# Patient Record
Sex: MALE | Race: WHITE | NOT HISPANIC OR LATINO | Employment: UNEMPLOYED | URBAN - METROPOLITAN AREA
[De-identification: names, ages, dates, MRNs, and addresses within clinical notes are randomized per-mention and may not be internally consistent; named-entity substitution may affect disease eponyms.]

---

## 2018-06-01 ENCOUNTER — OFFICE VISIT CONVERTED (OUTPATIENT)
Dept: FAMILY MEDICINE CLINIC | Facility: CLINIC | Age: 33
End: 2018-06-01
Attending: NURSE PRACTITIONER

## 2019-09-09 ENCOUNTER — OFFICE VISIT CONVERTED (OUTPATIENT)
Dept: FAMILY MEDICINE CLINIC | Facility: CLINIC | Age: 34
End: 2019-09-09
Attending: FAMILY MEDICINE

## 2020-01-31 ENCOUNTER — HOSPITAL ENCOUNTER (OUTPATIENT)
Dept: FAMILY MEDICINE CLINIC | Facility: CLINIC | Age: 35
Discharge: HOME | End: 2020-01-31
Attending: NURSE PRACTITIONER

## 2020-01-31 ENCOUNTER — OFFICE VISIT CONVERTED (OUTPATIENT)
Dept: FAMILY MEDICINE CLINIC | Facility: CLINIC | Age: 35
End: 2020-01-31
Attending: NURSE PRACTITIONER

## 2020-03-02 ENCOUNTER — HOSPITAL ENCOUNTER (OUTPATIENT)
Dept: FAMILY MEDICINE CLINIC | Facility: CLINIC | Age: 35
Discharge: HOME | End: 2020-03-02
Attending: NURSE PRACTITIONER

## 2020-03-02 ENCOUNTER — OFFICE VISIT CONVERTED (OUTPATIENT)
Dept: FAMILY MEDICINE CLINIC | Facility: CLINIC | Age: 35
End: 2020-03-02
Attending: NURSE PRACTITIONER

## 2020-07-10 ENCOUNTER — OFFICE VISIT CONVERTED (OUTPATIENT)
Dept: FAMILY MEDICINE CLINIC | Facility: CLINIC | Age: 35
End: 2020-07-10
Attending: NURSE PRACTITIONER

## 2021-05-09 VITALS
HEIGHT: 72 IN | SYSTOLIC BLOOD PRESSURE: 122 MMHG | TEMPERATURE: 97.8 F | DIASTOLIC BLOOD PRESSURE: 78 MMHG | OXYGEN SATURATION: 97 % | BODY MASS INDEX: 25.65 KG/M2 | HEART RATE: 73 BPM | WEIGHT: 189.37 LBS

## 2021-05-09 VITALS
WEIGHT: 188 LBS | DIASTOLIC BLOOD PRESSURE: 80 MMHG | SYSTOLIC BLOOD PRESSURE: 130 MMHG | TEMPERATURE: 98.6 F | OXYGEN SATURATION: 97 % | HEART RATE: 94 BPM | RESPIRATION RATE: 16 BRPM

## 2021-05-09 VITALS
WEIGHT: 196 LBS | SYSTOLIC BLOOD PRESSURE: 140 MMHG | DIASTOLIC BLOOD PRESSURE: 85 MMHG | HEART RATE: 84 BPM | OXYGEN SATURATION: 97 % | TEMPERATURE: 97.6 F

## 2021-05-09 VITALS
OXYGEN SATURATION: 98 % | DIASTOLIC BLOOD PRESSURE: 90 MMHG | TEMPERATURE: 98 F | HEART RATE: 88 BPM | WEIGHT: 195.37 LBS | SYSTOLIC BLOOD PRESSURE: 140 MMHG

## 2021-05-09 VITALS
TEMPERATURE: 97.3 F | HEIGHT: 72 IN | OXYGEN SATURATION: 96 % | SYSTOLIC BLOOD PRESSURE: 120 MMHG | DIASTOLIC BLOOD PRESSURE: 80 MMHG | WEIGHT: 195.12 LBS | BODY MASS INDEX: 26.43 KG/M2 | HEART RATE: 88 BPM

## 2021-12-07 ENCOUNTER — OFFICE VISIT (OUTPATIENT)
Dept: FAMILY MEDICINE CLINIC | Facility: CLINIC | Age: 36
End: 2021-12-07

## 2021-12-07 VITALS
HEART RATE: 98 BPM | WEIGHT: 203 LBS | BODY MASS INDEX: 28.42 KG/M2 | SYSTOLIC BLOOD PRESSURE: 150 MMHG | HEIGHT: 71 IN | OXYGEN SATURATION: 98 % | DIASTOLIC BLOOD PRESSURE: 90 MMHG | TEMPERATURE: 97.2 F

## 2021-12-07 DIAGNOSIS — R07.9 CHEST PAIN, UNSPECIFIED TYPE: Primary | ICD-10-CM

## 2021-12-07 DIAGNOSIS — Z11.59 NEED FOR HEPATITIS C SCREENING TEST: ICD-10-CM

## 2021-12-07 DIAGNOSIS — R03.0 ELEVATED BLOOD PRESSURE READING IN OFFICE WITHOUT DIAGNOSIS OF HYPERTENSION: ICD-10-CM

## 2021-12-07 DIAGNOSIS — G89.29 CHRONIC PAIN OF LEFT ANKLE: ICD-10-CM

## 2021-12-07 DIAGNOSIS — M25.572 CHRONIC PAIN OF LEFT ANKLE: ICD-10-CM

## 2021-12-07 LAB
ALBUMIN SERPL-MCNC: 4.7 G/DL (ref 3.5–5.2)
ALBUMIN/GLOB SERPL: 1.4 G/DL
ALP SERPL-CCNC: 61 U/L (ref 39–117)
ALT SERPL W P-5'-P-CCNC: 34 U/L (ref 1–41)
ANION GAP SERPL CALCULATED.3IONS-SCNC: 9.9 MMOL/L (ref 5–15)
AST SERPL-CCNC: 26 U/L (ref 1–40)
BASOPHILS # BLD AUTO: 0.03 10*3/MM3 (ref 0–0.2)
BASOPHILS NFR BLD AUTO: 0.5 % (ref 0–1.5)
BILIRUB SERPL-MCNC: 0.8 MG/DL (ref 0–1.2)
BUN SERPL-MCNC: 16 MG/DL (ref 6–20)
BUN/CREAT SERPL: 15.7 (ref 7–25)
CALCIUM SPEC-SCNC: 9.9 MG/DL (ref 8.6–10.5)
CHLORIDE SERPL-SCNC: 100 MMOL/L (ref 98–107)
CHOLEST SERPL-MCNC: 226 MG/DL (ref 0–200)
CK SERPL-CCNC: 301 U/L (ref 20–200)
CO2 SERPL-SCNC: 28.1 MMOL/L (ref 22–29)
CREAT SERPL-MCNC: 1.02 MG/DL (ref 0.76–1.27)
DEPRECATED RDW RBC AUTO: 38.6 FL (ref 37–54)
EOSINOPHIL # BLD AUTO: 0.03 10*3/MM3 (ref 0–0.4)
EOSINOPHIL NFR BLD AUTO: 0.5 % (ref 0.3–6.2)
ERYTHROCYTE [DISTWIDTH] IN BLOOD BY AUTOMATED COUNT: 12.5 % (ref 12.3–15.4)
GFR SERPL CREATININE-BSD FRML MDRD: 83 ML/MIN/1.73
GLOBULIN UR ELPH-MCNC: 3.3 GM/DL
GLUCOSE SERPL-MCNC: 99 MG/DL (ref 65–99)
HCT VFR BLD AUTO: 43.8 % (ref 37.5–51)
HCV AB SER DONR QL: NORMAL
HDLC SERPL-MCNC: 49 MG/DL (ref 40–60)
HGB BLD-MCNC: 15.6 G/DL (ref 13–17.7)
IMM GRANULOCYTES # BLD AUTO: 0.01 10*3/MM3 (ref 0–0.05)
IMM GRANULOCYTES NFR BLD AUTO: 0.2 % (ref 0–0.5)
LDLC SERPL CALC-MCNC: 145 MG/DL (ref 0–100)
LDLC/HDLC SERPL: 2.89 {RATIO}
LYMPHOCYTES # BLD AUTO: 1.51 10*3/MM3 (ref 0.7–3.1)
LYMPHOCYTES NFR BLD AUTO: 26.4 % (ref 19.6–45.3)
MAGNESIUM SERPL-MCNC: 2 MG/DL (ref 1.6–2.6)
MCH RBC QN AUTO: 30.5 PG (ref 26.6–33)
MCHC RBC AUTO-ENTMCNC: 35.6 G/DL (ref 31.5–35.7)
MCV RBC AUTO: 85.7 FL (ref 79–97)
MONOCYTES # BLD AUTO: 0.39 10*3/MM3 (ref 0.1–0.9)
MONOCYTES NFR BLD AUTO: 6.8 % (ref 5–12)
NEUTROPHILS NFR BLD AUTO: 3.75 10*3/MM3 (ref 1.7–7)
NEUTROPHILS NFR BLD AUTO: 65.6 % (ref 42.7–76)
NRBC BLD AUTO-RTO: 0 /100 WBC (ref 0–0.2)
PLATELET # BLD AUTO: 242 10*3/MM3 (ref 140–450)
PMV BLD AUTO: 10.6 FL (ref 6–12)
POTASSIUM SERPL-SCNC: 4.2 MMOL/L (ref 3.5–5.2)
PROT SERPL-MCNC: 8 G/DL (ref 6–8.5)
RBC # BLD AUTO: 5.11 10*6/MM3 (ref 4.14–5.8)
SODIUM SERPL-SCNC: 138 MMOL/L (ref 136–145)
T4 FREE SERPL-MCNC: 1.13 NG/DL (ref 0.93–1.7)
TRIGL SERPL-MCNC: 177 MG/DL (ref 0–150)
TROPONIN T SERPL-MCNC: <0.01 NG/ML (ref 0–0.03)
TSH SERPL DL<=0.05 MIU/L-ACNC: 1.55 UIU/ML (ref 0.27–4.2)
VLDLC SERPL-MCNC: 32 MG/DL (ref 5–40)
WBC NRBC COR # BLD: 5.72 10*3/MM3 (ref 3.4–10.8)

## 2021-12-07 PROCEDURE — 82043 UR ALBUMIN QUANTITATIVE: CPT | Performed by: NURSE PRACTITIONER

## 2021-12-07 PROCEDURE — 84439 ASSAY OF FREE THYROXINE: CPT | Performed by: NURSE PRACTITIONER

## 2021-12-07 PROCEDURE — 85025 COMPLETE CBC W/AUTO DIFF WBC: CPT | Performed by: NURSE PRACTITIONER

## 2021-12-07 PROCEDURE — 82550 ASSAY OF CK (CPK): CPT | Performed by: NURSE PRACTITIONER

## 2021-12-07 PROCEDURE — 83735 ASSAY OF MAGNESIUM: CPT | Performed by: NURSE PRACTITIONER

## 2021-12-07 PROCEDURE — 86803 HEPATITIS C AB TEST: CPT | Performed by: NURSE PRACTITIONER

## 2021-12-07 PROCEDURE — 93000 ELECTROCARDIOGRAM COMPLETE: CPT | Performed by: NURSE PRACTITIONER

## 2021-12-07 PROCEDURE — 84484 ASSAY OF TROPONIN QUANT: CPT | Performed by: NURSE PRACTITIONER

## 2021-12-07 PROCEDURE — 80053 COMPREHEN METABOLIC PANEL: CPT | Performed by: NURSE PRACTITIONER

## 2021-12-07 PROCEDURE — 99214 OFFICE O/P EST MOD 30 MIN: CPT | Performed by: NURSE PRACTITIONER

## 2021-12-07 PROCEDURE — 84443 ASSAY THYROID STIM HORMONE: CPT | Performed by: NURSE PRACTITIONER

## 2021-12-07 PROCEDURE — 80061 LIPID PANEL: CPT | Performed by: NURSE PRACTITIONER

## 2021-12-07 PROCEDURE — 82570 ASSAY OF URINE CREATININE: CPT | Performed by: NURSE PRACTITIONER

## 2021-12-07 PROCEDURE — 82553 CREATINE MB FRACTION: CPT | Performed by: NURSE PRACTITIONER

## 2021-12-07 NOTE — PROGRESS NOTES
Chief Complaint  Ankle Pain (LT lower leg pain ) and Chest Pain (irregular heart beat, asked for a referral to cardiology )    Subjective     {Problem List  Visit Diagnosis   Encounters  Notes  Medications  Labs  Result Review Imaging  Media :23}       Mick Howe presents to [facility]  History of Present Illness     Patient reports a history of chest pain. He knows that his BP is slightly elevated. He is known to Dr. Diallo, but he retired. He states that he has a low heart rate in the AM, around 48. Once he is up and about it will increase into the 50s. He continues to have intermittent chest pain and bradycardia. States it seems to be more prominent at night and when at rest.     He states he was seen here for the chest pain two years ago - had blood work, EKG, and then also had an echo. He did not have a Holter or stress test. 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 Despite the elevated BP they did not put him on any medication. He has been trying to follow a DASH diet and eat healthier. He does not eat red meat. He does exercise. States he was exercising 3-4 times per week, but recently developed leg pain and cut back.     He has left lower leg pain for the past 3-4 months - will become really tender in his ankle and tight; like it is inflamed. It will go away and then come back and he feels like it isn't healing. He denies any known injury; it just appeared one day and then increased in severity. At first he thought shin splints, but he didn't get better with rest. When he was exercising he was lifting weights and then a light to moderate jog a few times per week but he wasn't running extensively on it.     He had his annual physical exam with Dr. Sidhu ([facility]). He had a CMP and lipid done there.     History reviewed. No pertinent past medical history.    Allergies   Allergen Reactions   •  "Latex Rash        History reviewed. No pertinent surgical history.     Social History     Tobacco Use   • Smoking status: Never Smoker   • Smokeless tobacco: Never Used   Substance Use Topics   • Alcohol use: Not on file   • Drug use: Not on file       Family History   Problem Relation Age of Onset   • Hypothyroidism Mother    • Hypertension Father    • Heart failure Father    • Stroke Maternal Grandfather         Health Maintenance Due   Topic Date Due   • HEPATITIS C SCREENING  Never done        No current outpatient medications on file prior to visit.     No current facility-administered medications on file prior to visit.       Immunization History   Administered Date(s) Administered   • COVID-19 (PFIZER) 01/08/2021, 01/29/2021   • Flu Vaccine Quad PF >36MO 10/07/2015, 09/17/2018   • Hepatitis A 07/15/2018, 02/14/2019   • MMR 11/21/2018   • Td 08/19/2002   • Tdap 01/01/2014       Review of Systems     Objective     /90   Pulse 98   Temp 97.2 °F (36.2 °C)   Ht 180.3 cm (71\")   Wt 92.1 kg (203 lb)   SpO2 98%   BMI 28.31 kg/m²       Physical Exam  Vitals reviewed.   Constitutional:       General: He is not in acute distress.     Appearance: Normal appearance. He is well-developed and overweight.   HENT:      Head: Normocephalic and atraumatic.   Eyes:      General: No scleral icterus.     Conjunctiva/sclera: Conjunctivae normal.      Pupils: Pupils are equal, round, and reactive to light.   Neck:      Thyroid: No thyroid mass, thyromegaly or thyroid tenderness.      Vascular: No carotid bruit.      Trachea: Trachea normal.   Cardiovascular:      Rate and Rhythm: Normal rate and regular rhythm.      Pulses: Normal pulses.      Heart sounds: No murmur heard.      Pulmonary:      Effort: Pulmonary effort is normal.      Breath sounds: Normal breath sounds. No wheezing, rhonchi or rales.   Musculoskeletal:         General: Normal range of motion.      Cervical back: Normal range of motion and neck supple. "      Right lower leg: No edema.      Left lower leg: No edema.      Comments: No gross deformity of the left lower extremity/ankle. Range of motion appears to be within normal limits. No audible crepitus. No abnormal gait. No obvious swelling or erythema is appreciated.   Lymphadenopathy:      Cervical: No cervical adenopathy.   Skin:     General: Skin is warm and dry.   Neurological:      Mental Status: He is alert and oriented to person, place, and time.   Psychiatric:         Mood and Affect: Mood and affect normal.         Behavior: Behavior normal.         Thought Content: Thought content normal.         Judgment: Judgment normal.         Result Review :     The following data was reviewed by: HARMAN Shahid on 12/07/2021:    COMPREHENSIVE METABOLIC PANEL (01/08/2021 11:06)  LIPID PANEL (01/08/2021 11:06)    TROPONIN (IN-HOUSE) (09/17/2020 02:14)  MAGNESIUM (09/17/2020 02:14)  PROBNP (REFERENCE) (09/17/2020 02:14)  COMPREHENSIVE METABOLIC PANEL (09/17/2020 02:14)  CBC AND DIFFERENTIAL (09/17/2020 02:14)  APTT (09/17/2020 02:14)  PROTIME-INR (09/17/2020 02:14)  TROPONIN (IN-HOUSE) (09/17/2020 05:08)    Data reviewed: Consultant notes : Notes from Dr. Alejandro Mason in Care Everywhere, as well as physical note from Dr. Sidhu.      XR Ankle 3+ View Left (In Office) (12/07/2021 11:59)           Assessment and Plan      Diagnoses and all orders for this visit:    1. Chest pain, unspecified type (Primary)  -     Ambulatory Referral to Cardiology  -     CBC Auto Differential  -     Comprehensive Metabolic Panel  -     Lipid Panel  -     TSH+Free T4  -     Troponin  -     CK  -     CK MB  -     Magnesium  -     XR Ankle 3+ View Left (In Office)  -     ECG 12 Lead    2. Elevated blood pressure reading in office without diagnosis of hypertension  -     Ambulatory Referral to Cardiology  -     CBC Auto Differential  -     Comprehensive Metabolic Panel  -     Lipid Panel  -     TSH+Free T4  -     Microalbumin /  Creatinine Urine Ratio - Urine, Clean Catch  -     Troponin  -     CK  -     CK MB  -     Magnesium  -     XR Ankle 3+ View Left (In Office)    3. Chronic pain of left ankle  -     XR Ankle 3+ View Left (In Office)  -     MRI Ankle Left Without Contrast; Future    4. Need for hepatitis C screening test  -     Hepatitis C Antibody            Follow Up     Return for recheck pending outcome of labs.     He is going to see if he can find records regarding which medication he has taken in the past for his BP and did not tolerate. He will call and let me know. Advised that if he does not get in with cardiology in the next 2-3 weeks, then I would like to see him back for repeat BP. Voiced understanding.     Will order MRI of the ankle. Referral to ortho vs. Podiatry pending outcome of MRI. His wife is PT at [facility] and he wants to speak with her regarding who he should see.     Patient was given instructions and counseling regarding his condition or for health maintenance advice. Please see specific information pulled into the AVS if appropriate.     Mick Howe  reports that he has never smoked. He has never used smokeless tobacco.

## 2021-12-07 NOTE — PROGRESS NOTES
Venipuncture Blood Specimen Collection  Venipuncture performed in left arm by Trinity Ramirez with good hemostasis. Patient tolerated the procedure well without complications.   12/07/21   Trinity Ramirez

## 2021-12-08 DIAGNOSIS — R74.8 ELEVATED CK: Primary | ICD-10-CM

## 2021-12-08 LAB
ALBUMIN UR-MCNC: <1.2 MG/DL
CK MB SERPL-CCNC: 1.88 NG/ML
CREAT UR-MCNC: 60.4 MG/DL
MICROALBUMIN/CREAT UR: NORMAL MG/G{CREAT}

## 2021-12-13 ENCOUNTER — CLINICAL SUPPORT (OUTPATIENT)
Dept: FAMILY MEDICINE CLINIC | Facility: CLINIC | Age: 36
End: 2021-12-13

## 2021-12-13 DIAGNOSIS — R74.8 ELEVATED CK: ICD-10-CM

## 2021-12-13 LAB — CK SERPL-CCNC: 163 U/L (ref 20–200)

## 2021-12-13 PROCEDURE — 82550 ASSAY OF CK (CPK): CPT | Performed by: NURSE PRACTITIONER

## 2021-12-13 PROCEDURE — 36415 COLL VENOUS BLD VENIPUNCTURE: CPT | Performed by: NURSE PRACTITIONER

## 2021-12-13 NOTE — PROGRESS NOTES
Venipuncture Blood Specimen Collection  Venipuncture performed in left arm by Trinity Ramirez with good hemostasis. Patient tolerated the procedure well without complications.   12/13/21   Trinity Ramirez

## 2021-12-17 DIAGNOSIS — M67.472 GANGLION CYST OF LEFT FOOT: Primary | ICD-10-CM

## 2021-12-17 DIAGNOSIS — G89.29 CHRONIC PAIN OF LEFT ANKLE: ICD-10-CM

## 2021-12-17 DIAGNOSIS — M25.572 CHRONIC PAIN OF LEFT ANKLE: ICD-10-CM

## 2021-12-17 DIAGNOSIS — M67.88 LEFT PERONEAL TENDONOSIS: ICD-10-CM

## 2022-01-06 ENCOUNTER — OFFICE VISIT (OUTPATIENT)
Dept: FAMILY MEDICINE CLINIC | Facility: CLINIC | Age: 37
End: 2022-01-06

## 2022-01-06 ENCOUNTER — PATIENT MESSAGE (OUTPATIENT)
Dept: FAMILY MEDICINE CLINIC | Facility: CLINIC | Age: 37
End: 2022-01-06

## 2022-01-06 VITALS — TEMPERATURE: 97.9 F | OXYGEN SATURATION: 98 % | HEART RATE: 88 BPM

## 2022-01-06 DIAGNOSIS — U07.1 COVID-19 VIRUS INFECTION: ICD-10-CM

## 2022-01-06 DIAGNOSIS — R74.8 ELEVATED CK: ICD-10-CM

## 2022-01-06 DIAGNOSIS — M79.10 MYALGIA: Primary | ICD-10-CM

## 2022-01-06 LAB
25(OH)D3 SERPL-MCNC: 28 NG/ML
ALBUMIN SERPL-MCNC: 5 G/DL (ref 3.5–5.2)
ALBUMIN/GLOB SERPL: 1.9 G/DL
ALP SERPL-CCNC: 65 U/L (ref 39–117)
ALT SERPL W P-5'-P-CCNC: 23 U/L (ref 1–41)
ANION GAP SERPL CALCULATED.3IONS-SCNC: 8.1 MMOL/L (ref 5–15)
AST SERPL-CCNC: 24 U/L (ref 1–40)
BILIRUB SERPL-MCNC: 0.7 MG/DL (ref 0–1.2)
BUN SERPL-MCNC: 11 MG/DL (ref 6–20)
BUN/CREAT SERPL: 12 (ref 7–25)
CALCIUM SPEC-SCNC: 10 MG/DL (ref 8.6–10.5)
CHLORIDE SERPL-SCNC: 100 MMOL/L (ref 98–107)
CK SERPL-CCNC: 148 U/L (ref 20–200)
CO2 SERPL-SCNC: 27.9 MMOL/L (ref 22–29)
CREAT SERPL-MCNC: 0.92 MG/DL (ref 0.76–1.27)
FERRITIN SERPL-MCNC: 94.6 NG/ML (ref 30–400)
FOLATE SERPL-MCNC: >20 NG/ML (ref 4.78–24.2)
GFR SERPL CREATININE-BSD FRML MDRD: 94 ML/MIN/1.73
GLOBULIN UR ELPH-MCNC: 2.6 GM/DL
GLUCOSE SERPL-MCNC: 90 MG/DL (ref 65–99)
IRON 24H UR-MRATE: 97 MCG/DL (ref 59–158)
IRON SATN MFR SERPL: 28 % (ref 20–50)
MAGNESIUM SERPL-MCNC: 2.4 MG/DL (ref 1.6–2.6)
POTASSIUM SERPL-SCNC: 4.2 MMOL/L (ref 3.5–5.2)
PROT SERPL-MCNC: 7.6 G/DL (ref 6–8.5)
SODIUM SERPL-SCNC: 136 MMOL/L (ref 136–145)
TIBC SERPL-MCNC: 349 MCG/DL (ref 298–536)
TRANSFERRIN SERPL-MCNC: 234 MG/DL (ref 200–360)
VIT B12 BLD-MCNC: 621 PG/ML (ref 211–946)

## 2022-01-06 PROCEDURE — 83735 ASSAY OF MAGNESIUM: CPT | Performed by: NURSE PRACTITIONER

## 2022-01-06 PROCEDURE — 82306 VITAMIN D 25 HYDROXY: CPT | Performed by: NURSE PRACTITIONER

## 2022-01-06 PROCEDURE — 82728 ASSAY OF FERRITIN: CPT | Performed by: NURSE PRACTITIONER

## 2022-01-06 PROCEDURE — 83540 ASSAY OF IRON: CPT | Performed by: NURSE PRACTITIONER

## 2022-01-06 PROCEDURE — 99214 OFFICE O/P EST MOD 30 MIN: CPT | Performed by: NURSE PRACTITIONER

## 2022-01-06 PROCEDURE — 82550 ASSAY OF CK (CPK): CPT | Performed by: NURSE PRACTITIONER

## 2022-01-06 PROCEDURE — 82607 VITAMIN B-12: CPT | Performed by: NURSE PRACTITIONER

## 2022-01-06 PROCEDURE — 84466 ASSAY OF TRANSFERRIN: CPT | Performed by: NURSE PRACTITIONER

## 2022-01-06 PROCEDURE — 80053 COMPREHEN METABOLIC PANEL: CPT | Performed by: NURSE PRACTITIONER

## 2022-01-06 PROCEDURE — 82746 ASSAY OF FOLIC ACID SERUM: CPT | Performed by: NURSE PRACTITIONER

## 2022-01-06 RX ORDER — ASPIRIN 81 MG/1
81 TABLET, CHEWABLE ORAL DAILY
COMMUNITY
End: 2022-04-11

## 2022-01-06 RX ORDER — BACLOFEN 5 MG/1
5 TABLET ORAL 3 TIMES DAILY PRN
Qty: 60 TABLET | Refills: 0 | Status: SHIPPED | OUTPATIENT
Start: 2022-01-06 | End: 2022-04-11

## 2022-01-06 RX ORDER — LANOLIN ALCOHOL/MO/W.PET/CERES
CREAM (GRAM) TOPICAL DAILY
COMMUNITY
End: 2022-04-11

## 2022-01-06 NOTE — PROGRESS NOTES
Chief Complaint  Spasms (covid positive )    Subjective            Mick Howe presents to [facility]  History of Present Illness     Patient presents outside this morning due to COVID-19 positive.  He reports just prior to diagnosis he started to have some generalized body aches and myalgias.  He is not sure if it is related to the viral infection, or something else, but he continues to have spasms randomly throughout his body.  He can have them in his forearms or in the thigh, or in the calves bilaterally.    He does have a history of elevated CK, but on repeat exam it was normal.    History reviewed. No pertinent past medical history.    Allergies   Allergen Reactions   • Latex Rash        History reviewed. No pertinent surgical history.     Social History     Tobacco Use   • Smoking status: Never Smoker   • Smokeless tobacco: Never Used   Substance Use Topics   • Alcohol use: Not on file   • Drug use: Not on file       Family History   Problem Relation Age of Onset   • Hypothyroidism Mother    • Hypertension Father    • Heart failure Father    • Stroke Maternal Grandfather         Health Maintenance Due   Topic Date Due   • COVID-19 Vaccine (3 - Booster for Pfizer series) 07/29/2021        Current Outpatient Medications on File Prior to Visit   Medication Sig   • aspirin 81 MG chewable tablet Chew 81 mg Daily.   • melatonin 3 MG tablet Take  by mouth Daily.     No current facility-administered medications on file prior to visit.       Immunization History   Administered Date(s) Administered   • COVID-19 (PFIZER) 01/08/2021, 01/29/2021   • Flu Vaccine Quad PF >36MO 10/07/2015, 09/17/2018   • Hepatitis A 07/15/2018, 02/14/2019   • MMR 11/21/2018   • Td 08/19/2002   • Tdap 01/01/2014       Review of Systems     Objective     Pulse 88   Temp 97.9 °F (36.6 °C)   SpO2 98%       Physical Exam  Vitals reviewed.   Constitutional:       General: He is not in acute distress.      Appearance: Normal appearance. He is well-developed and overweight.   HENT:      Head: Normocephalic and atraumatic.   Eyes:      General: No scleral icterus.     Conjunctiva/sclera: Conjunctivae normal.   Neck:      Thyroid: No thyroid mass, thyromegaly or thyroid tenderness.      Trachea: Trachea normal.   Cardiovascular:      Rate and Rhythm: Normal rate and regular rhythm.      Pulses: Normal pulses.      Heart sounds: No murmur heard.      Pulmonary:      Effort: Pulmonary effort is normal.      Breath sounds: Normal breath sounds. No wheezing, rhonchi or rales.   Musculoskeletal:         General: Normal range of motion.      Comments: Evaluation is limited d/t patient being seen outside in his vehicle.    Skin:     General: Skin is warm and dry.   Neurological:      Mental Status: He is alert and oriented to person, place, and time.   Psychiatric:         Mood and Affect: Mood and affect normal.         Behavior: Behavior normal.         Thought Content: Thought content normal.         Judgment: Judgment normal.         Result Review :     The following data was reviewed by: HARMAN Shahid on 01/06/2022:    CBC Auto Differential (12/07/2021 12:03)  Comprehensive Metabolic Panel (12/07/2021 12:03)  Lipid Panel (12/07/2021 12:03)  TSH+Free T4 (12/07/2021 12:03)  Troponin (12/07/2021 12:03)  CK (12/07/2021 12:03)  CK MB (12/07/2021 12:03)  Magnesium (12/07/2021 12:03)  Hepatitis C Antibody (12/07/2021 12:03)  Microalbumin / Creatinine Urine Ratio - Urine, Clean Catch (12/07/2021 12:10)  CK (12/13/2021 15:13)              Assessment and Plan      Diagnoses and all orders for this visit:    1. Myalgia (Primary)  -     Iron Profile  -     Ferritin  -     Vitamin B12 & Folate  -     Vitamin D 25 Hydroxy  -     Comprehensive Metabolic Panel  -     Magnesium  -     Baclofen 5 MG tablet; Take 5 mg by mouth 3 (Three) Times a Day As Needed (muscle spasms/myalgia).  Dispense: 60 tablet; Refill: 0    2. Elevated  CK  -     CK    3. COVID-19 virus infection            Follow Up     I am going to repeat his CK today, but we will also repeat CMP, magnesium, iron profile, ferritin, B12 and folate, and vitamin D.  Trial of baclofen in the interim.  Discussed symptoms with patient.  It is possible that he could have repeat elevated CK, and if that is the case then we will refer to rheumatology for further evaluation.  Myalgias and spasms could also be related to underlying COVID-19 infection, and they could resolve with resolution of illness.  If symptom resolution does not occur, and CK is normal, then we will refer to neurology for further evaluation.  Trial of baclofen in the interim.    Patient was given instructions and counseling regarding his condition or for health maintenance advice. Please see specific information pulled into the AVS if appropriate.     Mick Howe  reports that he has never smoked. He has never used smokeless tobacco.

## 2022-01-07 DIAGNOSIS — E55.9 VITAMIN D INSUFFICIENCY: ICD-10-CM

## 2022-01-07 DIAGNOSIS — E55.9 VITAMIN D INSUFFICIENCY: Primary | ICD-10-CM

## 2022-01-07 RX ORDER — CHOLECALCIFEROL (VITAMIN D3) 125 MCG
2000 CAPSULE ORAL DAILY
Qty: 90 TABLET | Refills: 0 | Status: SHIPPED | OUTPATIENT
Start: 2022-01-07 | End: 2022-04-11

## 2022-01-07 RX ORDER — CHOLECALCIFEROL (VITAMIN D3) 125 MCG
2000 CAPSULE ORAL DAILY
Qty: 90 TABLET | Refills: 0 | Status: SHIPPED | OUTPATIENT
Start: 2022-01-07 | End: 2022-01-07 | Stop reason: SDUPTHER

## 2022-01-07 NOTE — TELEPHONE ENCOUNTER
Caller: Mick Howe    Relationship: Self    Best call back number:[phone]  Requested Prescriptions:   Requested Prescriptions      No prescriptions requested or ordered in this encounter    VITAMIN D    Pharmacy where request should be sent: [facility] [address] AT [facility] AND [facility] - [phone]  - [phone] FX       Genoveva VELASQUEZ Rep   01/07/22 13:11 EST

## 2022-01-10 ENCOUNTER — PATIENT MESSAGE (OUTPATIENT)
Dept: FAMILY MEDICINE CLINIC | Facility: CLINIC | Age: 37
End: 2022-01-10

## 2022-01-10 DIAGNOSIS — M62.838 MUSCLE SPASM: ICD-10-CM

## 2022-01-10 DIAGNOSIS — M79.10 MYALGIA: Primary | ICD-10-CM

## 2022-01-10 NOTE — TELEPHONE ENCOUNTER
From: Mick Howe  To: HARMAN Shahid  Sent: 1/6/2022 1:50 PM EST  Subject: I have taken vitamin D3 and B complex    I have recently taken a D3 and B complex vitamin along with my multivitamin. Will this affect my test results?

## 2022-01-11 NOTE — TELEPHONE ENCOUNTER
From: Mick Howe  To: HARMAN Shahid  Sent: 1/10/2022 5:31 PM EST  Subject: Request referral to [facility]    My symptoms seem to persist. Could I have a referral to see a neurologist in the [facility] system?

## 2022-04-11 ENCOUNTER — OFFICE VISIT (OUTPATIENT)
Dept: FAMILY MEDICINE CLINIC | Facility: CLINIC | Age: 37
End: 2022-04-11

## 2022-04-11 VITALS
HEART RATE: 87 BPM | TEMPERATURE: 97.6 F | OXYGEN SATURATION: 98 % | WEIGHT: 223 LBS | SYSTOLIC BLOOD PRESSURE: 150 MMHG | DIASTOLIC BLOOD PRESSURE: 100 MMHG | BODY MASS INDEX: 31.1 KG/M2

## 2022-04-11 DIAGNOSIS — M62.40 INVOLUNTARY MUSCLE CONTRACTIONS: ICD-10-CM

## 2022-04-11 DIAGNOSIS — M79.10 MYALGIA: Primary | ICD-10-CM

## 2022-04-11 DIAGNOSIS — R74.8 ELEVATED CK: ICD-10-CM

## 2022-04-11 DIAGNOSIS — M62.838 MUSCLE SPASM: ICD-10-CM

## 2022-04-11 DIAGNOSIS — E55.9 VITAMIN D INSUFFICIENCY: ICD-10-CM

## 2022-04-11 DIAGNOSIS — I10 ESSENTIAL HYPERTENSION: ICD-10-CM

## 2022-04-11 PROBLEM — S86.319A TEAR OF PERONEAL TENDON: Status: ACTIVE | Noted: 2021-12-21

## 2022-04-11 PROBLEM — K21.9 GERD (GASTROESOPHAGEAL REFLUX DISEASE): Status: ACTIVE | Noted: 2018-02-15

## 2022-04-11 PROBLEM — J30.2 SEASONAL ALLERGIC RHINITIS: Status: ACTIVE | Noted: 2022-04-11

## 2022-04-11 LAB
25(OH)D3 SERPL-MCNC: 36.3 NG/ML (ref 30–100)
CK SERPL-CCNC: 176 U/L (ref 20–200)

## 2022-04-11 PROCEDURE — 82306 VITAMIN D 25 HYDROXY: CPT | Performed by: NURSE PRACTITIONER

## 2022-04-11 PROCEDURE — 82550 ASSAY OF CK (CPK): CPT | Performed by: NURSE PRACTITIONER

## 2022-04-11 PROCEDURE — 99214 OFFICE O/P EST MOD 30 MIN: CPT | Performed by: NURSE PRACTITIONER

## 2022-04-11 PROCEDURE — 86200 CCP ANTIBODY: CPT | Performed by: NURSE PRACTITIONER

## 2022-04-11 PROCEDURE — 86431 RHEUMATOID FACTOR QUANT: CPT | Performed by: NURSE PRACTITIONER

## 2022-04-11 RX ORDER — PROPRANOLOL HYDROCHLORIDE 10 MG/1
10 TABLET ORAL 2 TIMES DAILY
Qty: 60 TABLET | Refills: 0 | Status: SHIPPED | OUTPATIENT
Start: 2022-04-11

## 2022-04-11 NOTE — PROGRESS NOTES
"Chief Complaint  Joint Pain (Joint tightness and pulling in bilateral hands ) and Cough    Subjective            Mick Howe presents to [facility]  History of Present Illness     Around the 23rd of December he started having muscle twitching in his thighs and shoulders x1 day, and then it was all over after that - the neurologist called them 'fasciculations'. A week later he started to coughing and burning sensation in his nose. Was diagnosed with COVID-19 approx. January 4 at [facility] in [place] - after his son had tested (+).     On January 6, he presented her with c/o burning sensation in his arms, feet, calves, and he couldn't sleep. He was having jerking as well.     He states that since all of that, his fasciculations have decreased - he does have some in the hand/fingers - his pinky and ring finger will sporadically draw up. He can intermittently feel them in his feet, in the in-steps. Sometimes the palms of his hands will turn white, like with Raynaud's. He describes a tightness between his fingers/knuckles. About two months ago he started to notice, upon waking in the AM, where his pinky and ring finger will have a robotic type motion. He has additionally noted facial twitching on the left, and muscle spasms of the right thigh.     He saw Dr. Helms (neurology with [facility]) regarding his symptoms - she did an EMG of right arm and right leg and told him that it was normal. She checked his reflexes and tone and all of that was normal. She provided reassurance - told him that his most likely had \"benign fasciculation syndrome\" or \"post-viral neuropathy\".     He is most concerned for something like ALS or MS. He has noticed an asymmetry of muscles in his neck.     He does endorse having anxiety, or being anxious; however, he feels like his anxiety could be exacerbated as of late due to his symptoms.  He is not sure if his symptoms are exacerbating the anxiety, or " "if the anxiety could be exacerbating his symptoms, or if they are not related at all.  His blood pressure is elevated on exam today x2.  He has taken blood pressure medication in the past, he believes lisinopril, but he did not tolerate that well.  He has never tried propranolol.  He states that he is \"sensitive\" to medications.  He currently denies any chest pain or palpitations.      Past Medical History:   Diagnosis Date   • Matti Hunt syndrome (geniculate herpes zoster) 2014       Allergies   Allergen Reactions   • Latex Rash        History reviewed. No pertinent surgical history.     Social History     Tobacco Use   • Smoking status: Never Smoker   • Smokeless tobacco: Never Used       Family History   Problem Relation Age of Onset   • Hypothyroidism Mother    • Hypertension Father    • Heart failure Father    • Stroke Maternal Grandfather         Health Maintenance Due   Topic Date Due   • COVID-19 Vaccine (3 - Booster for Pfizer series) 06/29/2021   • ANNUAL PHYSICAL  01/09/2022        Current Outpatient Medications on File Prior to Visit   Medication Sig   • [DISCONTINUED] aspirin 81 MG chewable tablet Chew 81 mg Daily.   • [DISCONTINUED] Baclofen 5 MG tablet Take 5 mg by mouth 3 (Three) Times a Day As Needed (muscle spasms/myalgia).   • [DISCONTINUED] Cholecalciferol (Vitamin D3) 50 MCG (2000 UT) tablet Take 1 tablet by mouth Daily.   • [DISCONTINUED] melatonin 3 MG tablet Take  by mouth Daily.     No current facility-administered medications on file prior to visit.       Immunization History   Administered Date(s) Administered   • COVID-19 (PFIZER) PURPLE CAP 01/08/2021, 01/29/2021   • Flu Vaccine Intradermal Quad 18-64YR 10/18/2021   • Flu Vaccine Quad PF >36MO 10/07/2015, 09/17/2018   • FluLaval/Fluarix/Fluzone >6 10/01/2017, 10/01/2019, 09/25/2020, 10/18/2021   • Hepatitis A 07/15/2018, 02/14/2019   • MMR 11/21/2018   • PPD Test 11/19/2018, 01/02/2020   • Td 08/19/2002   • Tdap 01/01/2014       Review " of Systems     Objective     /100 (BP Location: Right arm, Patient Position: Sitting, Cuff Size: Adult)   Pulse 87   Temp 97.6 °F (36.4 °C)   Wt 101 kg (223 lb)   SpO2 98%   BMI 31.10 kg/m²       Physical Exam  Vitals reviewed.   Constitutional:       General: He is not in acute distress.     Appearance: He is well-developed. He is obese.      Comments: Obese by BMI; does not appear to be obese on exam.   HENT:      Head: Normocephalic and atraumatic.   Eyes:      General: No scleral icterus.     Extraocular Movements: Extraocular movements intact.      Conjunctiva/sclera: Conjunctivae normal.   Neck:      Trachea: Trachea normal.      Comments: There does appear to be muscle asymmetry on the right side of his neck in comparison to the left.  Right is hypertrophied in comparison to the left.  Cardiovascular:      Rate and Rhythm: Normal rate and regular rhythm.      Pulses: Normal pulses.      Heart sounds: No murmur heard.  Pulmonary:      Effort: Pulmonary effort is normal. No respiratory distress.      Breath sounds: Normal breath sounds. No wheezing, rhonchi or rales.   Musculoskeletal:         General: Normal range of motion.      Cervical back: Normal range of motion and neck supple.      Right lower leg: No edema.      Left lower leg: No edema.   Lymphadenopathy:      Cervical: No cervical adenopathy.   Skin:     General: Skin is warm and dry.   Neurological:      Mental Status: He is alert and oriented to person, place, and time.   Psychiatric:         Mood and Affect: Mood and affect normal.         Behavior: Behavior normal.         Thought Content: Thought content normal.         Judgment: Judgment normal.         Result Review :     The following data was reviewed by: HARMAN Shahid on 04/11/2022:    CK (01/06/2022 10:44)  Magnesium (01/06/2022 10:44)  Comprehensive Metabolic Panel (01/06/2022 10:44)  Vitamin D 25 Hydroxy (01/06/2022 10:44)  Vitamin B12 & Folate (01/06/2022  10:44)  Ferritin (01/06/2022 10:44)  Iron Profile (01/06/2022 10:44)    CBC Auto Differential (12/07/2021 12:03)  Comprehensive Metabolic Panel (12/07/2021 12:03)  Lipid Panel (12/07/2021 12:03)  TSH+Free T4 (12/07/2021 12:03)  Troponin (12/07/2021 12:03)  CK (12/07/2021 12:03)  CK MB (12/07/2021 12:03)  Magnesium (12/07/2021 12:03)  Hepatitis C Antibody (12/07/2021 12:03)  Microalbumin / Creatinine Urine Ratio - Urine, Clean Catch (12/07/2021 12:10)    CK (12/13/2021 15:13)        Data reviewed: Consultant notes :   Care everywhere accessed.  I reviewed Dr. To's notes from his office visit with her in February 2022.         Assessment and Plan      Diagnoses and all orders for this visit:    1. Myalgia (Primary)  -     MRI Brain With & Without Contrast; Future  -     Rheumatoid Arthritis (RA) Profile    2. Muscle spasm  -     MRI Brain With & Without Contrast; Future  -     Rheumatoid Arthritis (RA) Profile    3. Elevated CK  -     CK    4. Vitamin D insufficiency  -     Vitamin D 25 Hydroxy    5. Involuntary muscle contractions  -     MRI Brain With & Without Contrast; Future    6. Essential hypertension  -     propranolol (INDERAL) 10 MG tablet; Take 1 tablet by mouth 2 (Two) Times a Day.  Dispense: 60 tablet; Refill: 0            Follow Up     Return for follow up pending outcome of labs/imaging.     I am going to attempt to get an MRI with and without contrast to further assess his complaints of myalgia, muscle weakness, spasms, and involuntary muscle contractions.  I will repeat his CK due to elevated CK in the past.  His repeat CK was normal.  Recheck vitamin D.  I will also get an RA profile.  If the RA profile is abnormal then I will refer to rheumatology.  If his MRI is normal, then we will seek a second opinion from neurology.  If MRI is abnormal, then we will refer appropriately based on abnormal findings.    In regards to his elevated blood pressure, we are going to attempt a trial of propranolol,  but at a low dose due to medication sensitivities.  He will check his blood pressure x1 week and message me via [employer] with his readings and we will titrate accordingly as tolerated.  I am hoping that this may also help with anxiety symptoms and have discussed that with him.    Patient was given instructions and counseling regarding his condition or for health maintenance advice. Please see specific information pulled into the AVS if appropriate.     Mick Howe  reports that he has never smoked. He has never used smokeless tobacco.

## 2022-04-14 LAB
CCP IGA+IGG SERPL IA-ACNC: 9 UNITS (ref 0–19)
RHEUMATOID FACT SERPL-ACNC: <10 IU/ML

## 2022-04-21 ENCOUNTER — TELEPHONE (OUTPATIENT)
Dept: FAMILY MEDICINE CLINIC | Facility: CLINIC | Age: 37
End: 2022-04-21

## 2022-04-21 DIAGNOSIS — M79.10 MYALGIA: Primary | ICD-10-CM

## 2022-04-21 DIAGNOSIS — R93.0 ABNORMAL MRI OF HEAD: ICD-10-CM

## 2022-04-21 DIAGNOSIS — M62.40 INVOLUNTARY MUSCLE CONTRACTIONS: ICD-10-CM

## 2022-04-21 DIAGNOSIS — M62.838 MUSCLE SPASM: ICD-10-CM

## 2022-04-21 NOTE — TELEPHONE ENCOUNTER
PATIENT IS CALLING BACK TO GO FORWARD AND BE REFERRED TO NEUROLOGY [facility]. DR. TIM CRUZ MD IF HE TAKES CARE OF HIS DIAG.    Can you put in another neurology referral thanks

## 2022-04-25 DIAGNOSIS — I10 ESSENTIAL HYPERTENSION: ICD-10-CM

## 2022-04-25 RX ORDER — PROPRANOLOL HYDROCHLORIDE 10 MG/1
TABLET ORAL
Qty: 60 TABLET | Refills: 0 | OUTPATIENT
Start: 2022-04-25

## 2022-07-18 ENCOUNTER — PATIENT MESSAGE (OUTPATIENT)
Dept: FAMILY MEDICINE CLINIC | Facility: CLINIC | Age: 37
End: 2022-07-18

## 2022-07-18 DIAGNOSIS — R74.8 ELEVATED CK: ICD-10-CM

## 2022-07-18 DIAGNOSIS — M62.838 MUSCLE SPASM: ICD-10-CM

## 2022-07-18 DIAGNOSIS — M62.40 INVOLUNTARY MUSCLE CONTRACTIONS: ICD-10-CM

## 2022-07-18 DIAGNOSIS — M79.10 MYALGIA: Primary | ICD-10-CM

## 2022-07-19 NOTE — TELEPHONE ENCOUNTER
From: Mick Howe  To: Rosa Coyne, HARMAN  Sent: 7/18/2022 3:19 PM EDT  Subject: Rheumatologist referral     Hello,  I contine to have worsen symptoms in my hands and feet especially my joints. I was wondering if I could get a referral to a rheumatologist.  Thank you so much,  Mick

## 2022-09-21 ENCOUNTER — HOSPITAL ENCOUNTER (OUTPATIENT)
Dept: ULTRASOUND IMAGING | Facility: HOSPITAL | Age: 37
Discharge: HOME | End: 2022-09-21
Admitting: STUDENT IN AN ORGANIZED HEALTH CARE EDUCATION/TRAINING PROGRAM

## 2022-09-21 ENCOUNTER — TRANSCRIBE ORDERS (OUTPATIENT)
Dept: ADMINISTRATIVE | Facility: HOSPITAL | Age: 37
End: 2022-09-21

## 2022-09-21 DIAGNOSIS — E07.9 THYROID MASS: Primary | ICD-10-CM

## 2022-09-21 DIAGNOSIS — E07.9 THYROID MASS: ICD-10-CM

## 2022-09-21 PROCEDURE — 76536 US EXAM OF HEAD AND NECK: CPT

## 2023-09-05 ENCOUNTER — HOSPITAL ENCOUNTER (EMERGENCY)
Facility: HOSPITAL | Age: 38
Discharge: HOME | End: 2023-09-05
Attending: EMERGENCY MEDICINE
Payer: COMMERCIAL

## 2023-09-05 VITALS
OXYGEN SATURATION: 100 % | HEIGHT: 72 IN | HEART RATE: 72 BPM | DIASTOLIC BLOOD PRESSURE: 83 MMHG | TEMPERATURE: 98.3 F | SYSTOLIC BLOOD PRESSURE: 149 MMHG | WEIGHT: 207.23 LBS | BODY MASS INDEX: 28.07 KG/M2 | RESPIRATION RATE: 18 BRPM

## 2023-09-05 DIAGNOSIS — G44.319 ACUTE POST-TRAUMATIC HEADACHE, NOT INTRACTABLE: ICD-10-CM

## 2023-09-05 DIAGNOSIS — S01.01XA LACERATION OF SCALP, INITIAL ENCOUNTER: Primary | ICD-10-CM

## 2023-09-05 DIAGNOSIS — Z78.9: ICD-10-CM

## 2023-09-05 PROCEDURE — 99283 EMERGENCY DEPT VISIT LOW MDM: CPT

## 2023-09-05 PROCEDURE — 90471 IMMUNIZATION ADMIN: CPT

## 2023-09-05 PROCEDURE — 90715 TDAP VACCINE 7 YRS/> IM: CPT

## 2023-09-05 PROCEDURE — 25010000002 TETANUS-DIPHTH-ACELL PERTUSSIS 5-2.5-18.5 LF-MCG/0.5 SUSPENSION PREFILLED SYRINGE

## 2023-09-05 RX ORDER — TRAMADOL HYDROCHLORIDE 50 MG/1
50 TABLET ORAL ONCE
Status: COMPLETED | OUTPATIENT
Start: 2023-09-05 | End: 2023-09-05

## 2023-09-05 RX ORDER — IBUPROFEN 800 MG/1
800 TABLET ORAL EVERY 6 HOURS PRN
Qty: 20 TABLET | Refills: 0 | Status: SHIPPED | OUTPATIENT
Start: 2023-09-05 | End: 2023-09-05 | Stop reason: ALTCHOICE

## 2023-09-05 RX ORDER — CEPHALEXIN 500 MG/1
500 CAPSULE ORAL 4 TIMES DAILY
Qty: 20 CAPSULE | Refills: 0 | Status: SHIPPED | OUTPATIENT
Start: 2023-09-05 | End: 2023-09-10

## 2023-09-05 RX ORDER — LEVOTHYROXINE SODIUM 0.12 MG/1
125 TABLET ORAL DAILY
COMMUNITY
Start: 2023-08-28

## 2023-09-05 RX ORDER — MELATONIN
1000 DAILY
COMMUNITY

## 2023-09-05 RX ORDER — NAPROXEN 500 MG/1
500 TABLET ORAL 2 TIMES DAILY PRN
Qty: 20 TABLET | Refills: 0 | Status: SHIPPED | OUTPATIENT
Start: 2023-09-05

## 2023-09-05 RX ADMIN — TRAMADOL HYDROCHLORIDE 50 MG: 50 TABLET ORAL at 20:14

## 2023-09-05 RX ADMIN — TETANUS TOXOID, REDUCED DIPHTHERIA TOXOID AND ACELLULAR PERTUSSIS VACCINE, ADSORBED 0.5 ML: 5; 2.5; 8; 8; 2.5 SUSPENSION INTRAMUSCULAR at 19:30

## 2023-09-05 NOTE — ED PROVIDER NOTES
Time: 6:31 PM EDT  Date of encounter:  9/5/2023  Independent Historian/Clinical History and Information was obtained by:   Patient    History is limited by: N/A    Chief Complaint: scalp laceration       History of Present Illness:  Patient is a 38 y.o. year old male who presents to the emergency department after sustaining a laceration to crown of scalp after striking his head on the mes[place] of family mini-van. Pt had no LOC. Current pain is 10    HPI    Patient Care Team  Primary Care Provider: Rosa Coyne APRN    Past Medical History:     Allergies   Allergen Reactions    Latex Rash     Past Medical History:   Diagnosis Date    [place] Hunt syndrome (geniculate herpes zoster) 2014     Past Surgical History:   Procedure Laterality Date    APPENDECTOMY      VARICOCELE EXCISION       Family History   Problem Relation Age of Onset    Hypothyroidism Mother     Hypertension Father     Heart failure Father     Stroke Maternal Grandfather        Home Medications:  Prior to Admission medications    Medication Sig Start Date End Date Taking? Authorizing Provider   propranolol (INDERAL) 10 MG tablet Take 1 tablet by mouth 2 (Two) Times a Day. 4/11/22   Rosa Coyne APRN        Social History:   Social History     Tobacco Use    Smoking status: Never     Passive exposure: Never    Smokeless tobacco: Never   Substance Use Topics    Drug use: Never         Review of Systems:  Review of Systems   Constitutional:  Negative for chills and fever.   HENT:  Negative for congestion, ear pain and sore throat.    Eyes:  Negative for pain.   Respiratory:  Negative for cough, chest tightness and shortness of breath.    Cardiovascular:  Negative for chest pain.   Gastrointestinal:  Negative for abdominal pain, diarrhea, nausea and vomiting.   Genitourinary:  Negative for flank pain and hematuria.   Musculoskeletal:  Negative for joint swelling.   Skin:  Positive for wound (top of head). Negative for pallor.   Neurological:  " Positive for headaches. Negative for seizures and light-headedness.   All other systems reviewed and are negative.     Physical Exam:  /83   Pulse 72   Temp 98.3 °F (36.8 °C)   Resp 18   Ht 182.9 cm (72\")   Wt 94 kg (207 lb 3.7 oz)   SpO2 100%   BMI 28.11 kg/m²     Physical Exam  Vitals and nursing note reviewed.   Constitutional:       General: He is not in acute distress.     Appearance: Normal appearance. He is not toxic-appearing.   HENT:      Head: Normocephalic and atraumatic.        Mouth/Throat:      Mouth: Mucous membranes are moist.   Eyes:      General: No scleral icterus.  Cardiovascular:      Rate and Rhythm: Normal rate and regular rhythm.      Pulses: Normal pulses.      Heart sounds: Normal heart sounds.   Pulmonary:      Effort: Pulmonary effort is normal. No respiratory distress.      Breath sounds: Normal breath sounds.   Abdominal:      General: Abdomen is flat.      Palpations: Abdomen is soft.      Tenderness: There is no abdominal tenderness.   Musculoskeletal:         General: Tenderness (skin surrounding laceration is tender when touched) and signs of injury present. Normal range of motion.      Cervical back: Normal range of motion and neck supple.   Skin:     General: Skin is warm and dry.      Coloration: Skin is not jaundiced or pale.      Findings: No bruising, erythema, lesion or rash.   Neurological:      Mental Status: He is alert and oriented to person, place, and time. Mental status is at baseline.                Procedures:  Laceration Repair    Date/Time: 9/5/2023 8:10 PM  Performed by: Cally Patel APRN  Authorized by: Pacheco Pierce MD     Consent:     Consent obtained:  Verbal    Consent given by:  Patient    Risks, benefits, and alternatives were discussed: yes      Risks discussed:  Infection, pain and poor wound healing  Universal protocol:     Procedure explained and questions answered to patient or proxy's satisfaction: yes      Patient " identity confirmed:  Verbally with patient  Anesthesia:     Anesthesia method:  None  Laceration details:     Location:  Scalp    Scalp location:  Crown    Length (cm):  3  Pre-procedure details:     Preparation:  Patient was prepped and draped in usual sterile fashion  Exploration:     Limited defect created (wound extended): no      Hemostasis achieved with:  Direct pressure    Imaging outcome: foreign body not noted    Treatment:     Area cleansed with:  Saline and chlorhexidine    Amount of cleaning:  Standard    Irrigation method:  Syringe  Skin repair:     Repair method:  Staples    Number of staples:  5  Repair type:     Repair type:  Simple  Post-procedure details:     Procedure completion:  Tolerated well, no immediate complications      Medical Decision Making:      Comorbidities that affect care:    None    External Notes reviewed:    Previous Clinic Note: 7/25/2023 with PCP for seasonal allergies      The following orders were placed and all results were independently analyzed by me:  Orders Placed This Encounter   Procedures    Laceration Repair       Medications Given in the Emergency Department:  Medications   Tetanus-Diphth-Acell Pertussis (BOOSTRIX) injection 0.5 mL (0.5 mL Intramuscular Given 9/5/23 1930)   traMADol (ULTRAM) tablet 50 mg (50 mg Oral Given 9/5/23 2014)        ED Course:         Labs:    Lab Results (last 24 hours)       ** No results found for the last 24 hours. **             Imaging:    No Radiology Exams Resulted Within Past 24 Hours      Differential Diagnosis and Discussion:    Laceration: Laceration was evaluated for arterial injury, ligamentous damage, and other neurovascular injury.        MDM  Number of Diagnoses or Management Options  Acute post-traumatic headache, not intractable: new and does not require workup  Laceration of scalp, initial encounter: new and does not require workup     Amount and/or Complexity of Data Reviewed  Review and summarize past medical records:  yes (I have personally reviewed patient's previous medical encounters.  )    Risk of Complications, Morbidity, and/or Mortality  Presenting problems: moderate  Management options: moderate    Patient Progress  Patient progress: stable           Patient Care Considerations:    CT HEAD: I considered ordering a noncontrast CT of the head, however patient had no loss of consciousness and no signs and symptoms of a traumatic TBI including hemorrhage, herniation, or skull fracture. He is not on blood thinners and he is awake, alert, and oriented with  no neuro deficits.  Pupils are PERRL,      Consultants/Shared Management Plan:    None    Social Determinants of Health:    Patient is independent, reliable, and has access to care.       Disposition and Care Coordination:    Discharged: The patient is suitable and stable for discharge with no need for consideration of observation or admission.    I have explained the patient´s condition, diagnoses and treatment plan based on the information available to me at this time. I have answered questions and addressed any concerns. The patient has a good  understanding of the patient´s diagnosis, condition, and treatment plan as can be expected at this point. The vital signs have been stable. The patient´s condition is stable and appropriate for discharge from the emergency department.      The patient will pursue further outpatient evaluation with the primary care physician or other designated or consulting physician as outlined in the discharge instructions. They are agreeable to this plan of care and follow-up instructions have been explained in detail. The patient has received these instructions in written format and have expressed an understanding of the discharge instructions. The patient is aware that any significant change in condition or worsening of symptoms should prompt an immediate return to this or the closest emergency department or call to 911.    Final diagnoses:    Laceration of scalp, initial encounter   Acute post-traumatic headache, not intractable   History of tetanus and diphtheria vaccination unknown        ED Disposition       ED Disposition   Discharge    Condition   Stable    Comment   --               This medical record created using voice recognition software.             Cally Patel, APRN  09/06/23 [id number]

## 2023-09-06 NOTE — DISCHARGE INSTRUCTIONS
Please follow up with your PCP in 5-7 days to have your wound re-evaluated and staples possible removed. Please take your medications as directed.  Please wash the area daily with warm soapy water, pat dry, and apply triple antibiotic ointment if you notice any drainage.  Return to the emergency department if you develop a fever that you cannot control with Tylenol or Motrin, severe nausea, vomiting, diarrhea, the worst headache of your life, or have any other concerns surrounding tonight's ER visit.  Otherwise follow-up with your primary care provider in 5 to 7 days.

## 2023-09-06 NOTE — ED NOTES
Pt discharged home wound care was discussed with patient and wife, they ambulated out of ED in no distress. All questions were answered. Staples were intact upon discharge and pt verbalized understanding of wound care, follow up and medications.